# Patient Record
Sex: MALE | Race: WHITE | NOT HISPANIC OR LATINO | Employment: FULL TIME | URBAN - METROPOLITAN AREA
[De-identification: names, ages, dates, MRNs, and addresses within clinical notes are randomized per-mention and may not be internally consistent; named-entity substitution may affect disease eponyms.]

---

## 2023-01-13 ENCOUNTER — TELEPHONE (OUTPATIENT)
Dept: SCHEDULING | Facility: IMAGING CENTER | Age: 28
End: 2023-01-13

## 2023-01-13 ENCOUNTER — OFFICE VISIT (OUTPATIENT)
Dept: MEDICAL GROUP | Facility: MEDICAL CENTER | Age: 28
End: 2023-01-13
Payer: COMMERCIAL

## 2023-01-13 VITALS
HEIGHT: 69 IN | SYSTOLIC BLOOD PRESSURE: 115 MMHG | HEART RATE: 95 BPM | BODY MASS INDEX: 30.11 KG/M2 | WEIGHT: 203.26 LBS | DIASTOLIC BLOOD PRESSURE: 54 MMHG | TEMPERATURE: 98.6 F

## 2023-01-13 DIAGNOSIS — R10.31 RLQ ABDOMINAL PAIN: ICD-10-CM

## 2023-01-13 DIAGNOSIS — H61.22 IMPACTED CERUMEN OF LEFT EAR: ICD-10-CM

## 2023-01-13 DIAGNOSIS — M72.9 MUSCULOSKELETAL FIBROMATOSIS: ICD-10-CM

## 2023-01-13 DIAGNOSIS — R53.83 OTHER FATIGUE: ICD-10-CM

## 2023-01-13 DIAGNOSIS — F32.4 MAJOR DEPRESSIVE DISORDER WITH SINGLE EPISODE, IN PARTIAL REMISSION (HCC): ICD-10-CM

## 2023-01-13 DIAGNOSIS — F41.9 ANXIETY: ICD-10-CM

## 2023-01-13 DIAGNOSIS — Z11.59 ENCOUNTER FOR HEPATITIS C SCREENING TEST FOR LOW RISK PATIENT: ICD-10-CM

## 2023-01-13 DIAGNOSIS — R73.09 ELEVATED GLUCOSE LEVEL: ICD-10-CM

## 2023-01-13 LAB
APPEARANCE UR: NORMAL
BILIRUB UR STRIP-MCNC: NEGATIVE MG/DL
COLOR UR AUTO: YELLOW
GLUCOSE BLD-MCNC: 117 MG/DL (ref 70–100)
GLUCOSE UR STRIP.AUTO-MCNC: NEGATIVE MG/DL
KETONES UR STRIP.AUTO-MCNC: NEGATIVE MG/DL
LEUKOCYTE ESTERASE UR QL STRIP.AUTO: NEGATIVE
NITRITE UR QL STRIP.AUTO: NEGATIVE
PH UR STRIP.AUTO: 7.5 [PH] (ref 5–8)
PROT UR QL STRIP: NEGATIVE MG/DL
RBC UR QL AUTO: NEGATIVE
SP GR UR STRIP.AUTO: 1.02
UROBILINOGEN UR STRIP-MCNC: NORMAL MG/DL

## 2023-01-13 PROCEDURE — 81002 URINALYSIS NONAUTO W/O SCOPE: CPT | Performed by: FAMILY MEDICINE

## 2023-01-13 PROCEDURE — 99204 OFFICE O/P NEW MOD 45 MIN: CPT | Performed by: FAMILY MEDICINE

## 2023-01-13 PROCEDURE — 82962 GLUCOSE BLOOD TEST: CPT | Performed by: FAMILY MEDICINE

## 2023-01-13 ASSESSMENT — PATIENT HEALTH QUESTIONNAIRE - PHQ9
SUM OF ALL RESPONSES TO PHQ QUESTIONS 1-9: 8
CLINICAL INTERPRETATION OF PHQ2 SCORE: 1
5. POOR APPETITE OR OVEREATING: 2 - MORE THAN HALF THE DAYS

## 2023-01-13 ASSESSMENT — ANXIETY QUESTIONNAIRES
4. TROUBLE RELAXING: NOT AT ALL
7. FEELING AFRAID AS IF SOMETHING AWFUL MIGHT HAPPEN: NOT AT ALL
2. NOT BEING ABLE TO STOP OR CONTROL WORRYING: SEVERAL DAYS
3. WORRYING TOO MUCH ABOUT DIFFERENT THINGS: MORE THAN HALF THE DAYS
1. FEELING NERVOUS, ANXIOUS, OR ON EDGE: SEVERAL DAYS
5. BEING SO RESTLESS THAT IT IS HARD TO SIT STILL: SEVERAL DAYS
6. BECOMING EASILY ANNOYED OR IRRITABLE: SEVERAL DAYS
GAD7 TOTAL SCORE: 6

## 2023-01-14 NOTE — ASSESSMENT & PLAN NOTE
Chronic, stable.  Differentials include thyroid, low testosterone, anemia, electrolyte disturbances, diabetes.  Collected POC glucose, 114.  Ordered A1c for the patient.  As potential sources for fatigue.  Recommended that he eat a pretty regular diet, healthy.  Increasing cardiovascular exercise.

## 2023-01-14 NOTE — ASSESSMENT & PLAN NOTE
Chronic, stable.  Recommended to reach out if he feels that his depression symptoms are starting to worsen.  Offered talk therapy services and potential medication management options.

## 2023-01-14 NOTE — ASSESSMENT & PLAN NOTE
Chronic, stable.  Right lower quadrant ultrasound ordered for the patient.  Discussed signs and symptoms of when to seek treatment the emergency room.

## 2023-01-14 NOTE — PROGRESS NOTES
Yusuf Thibodeaux is a pleasant 27 y.o. male here for   Chief Complaint   Patient presents with    Establish Care    GI Problem    RLQ Pain      HPI:   Problem   Anxiety    History of anxiety symptoms, feels that his anxiety is relatively stable.  Has episodes where he feels self-conscious and focuses on small things.  Feels more self consouse and focuses on things.  Does report increased anxiety and depression symptoms towards the end of the year secondary to his current job.  States that since he ended the year is over he is feeling much better.    LISSA-7 Questionnaire    Feeling nervous, anxious, or on edge: Several days  Not being able to sop or control worrying: Several days  Worrying too much about different things: More than half the days  Trouble relaxing: Not at all  Being so restless that it's hard to sit still: Several days  Becoming easily annoyed or irritable: Several days  Feeling afraid as if something awful might happen: Not at all  Total: 6    Interpretation of LISSA 7 Total Score   Score Severity :  0-4 No Anxiety   5-9 Mild Anxiety  10-14 Moderate Anxiety  15-21 Severe Anxiety     Major Depressive Disorder With Single Episode, in Partial Remission (Hcc)    Was having depression symptoms sometime ago in which she ended up reaching out to an online clinical psychologist.  States he did really well during that come to find out that his job is the primary source of his depression.  Depression improved when he changed jobs.     Depression Screening    Little interest or pleasure in doing things?  1 - several days   Feeling down, depressed , or hopeless? 0 - not at all   Trouble falling or staying asleep, or sleeping too much?  1 - several days   Feeling tired or having little energy?  2 - more than half the days   Poor appetite or overeating?  2 - more than half the days   Feeling bad about yourself - or that you are a failure or have let yourself or your family down? 2 - more than half the days   Trouble  concentrating on things, such as reading the newspaper or watching television? 0 - not at all   Moving or speaking so slowly that other people could have noticed.  Or the opposite - being so fidgety or restless that you have been moving around a lot more than usual?  0 - not at all   Thoughts that you would be better off dead, or of hurting yourself?  0 - not at all   Patient Health Questionnaire Score: 8       If depressive symptoms identified deferred to follow up visit unless specifically addressed in assesment and plan.    Interpretation of PHQ-9 Total Score   Score Severity   1-4 No Depression   5-9 Mild Depression   10-14 Moderate Depression   15-19 Moderately Severe Depression   20-27 Severe Depression     Other Fatigue    Fatigue for the last 3 months, started to take extra protein to help.  Reports more frothy urine and now with foul smelling, describes as ammonia.  Reporting more spontaneous bowel movements, will range between normal BMs and diarrhea.  Increase in thirst at work, but not outside of that.  Denies changes in vision.     For his fatigue will run x 2 times a week and lift weights 3 times a week.  Week days will get about 7 hours, weekends will get 8 hours of sleep.  Not well rested in the am.  Denies excessive dry skin, for hair falling out.    Since the start of fatigue he has also reporting right lower quadrant discomfort.     Rlq Abdominal Pain    Dull pain started about 3 months ago.  Comes and goes.  Worse with the need for a BM, improves after BM.  Denies blood in stool, N/V  Change with eating patterns.     Musculoskeletal Fibromatosis    2 cyst removals to the right hand.  Denies any reoccurrences of cysts.  Last surgery was 9 year     Closed Fracture of Right Clavicle (Resolved)        Current Medicines (including changes today)  Current Outpatient Medications   Medication Sig Dispense Refill    MELATONIN PO Take  by mouth.       No current facility-administered medications for this  "visit.     Past Medical/ Surgical History  He  has a past medical history of Closed fracture of right clavicle (11/13/2015).  He  has a past surgical history that includes cystectomy (Right) and other orthopedic surgery.     Objective:     /54 (BP Location: Right arm, Patient Position: Sitting, BP Cuff Size: Adult)   Pulse 95   Temp 37 °C (98.6 °F) (Temporal)   Ht 1.753 m (5' 9\")   Wt 92.2 kg (203 lb 4.2 oz)  Body mass index is 30.02 kg/m².    Physical Exam  Constitutional:       General: He is not in acute distress.  HENT:      Head: Normocephalic and atraumatic.      Right Ear: Tympanic membrane and external ear normal.      Left Ear: Tympanic membrane and external ear normal.   Eyes:      General: Lids are normal.      Extraocular Movements: Extraocular movements intact.      Conjunctiva/sclera: Conjunctivae normal.      Pupils: Pupils are equal, round, and reactive to light.   Neck:      Trachea: Trachea normal.   Cardiovascular:      Rate and Rhythm: Normal rate and regular rhythm.      Heart sounds: Normal heart sounds. No murmur heard.    No friction rub. No gallop.   Pulmonary:      Effort: Pulmonary effort is normal. No accessory muscle usage.      Breath sounds: Normal breath sounds. No wheezing or rales.   Abdominal:      General: Bowel sounds are normal.      Palpations: Abdomen is soft.      Tenderness: There is abdominal tenderness in the right lower quadrant.   Musculoskeletal:      Cervical back: Normal range of motion and neck supple.      Right lower leg: No edema.      Left lower leg: No edema.   Lymphadenopathy:      Cervical: No cervical adenopathy.   Skin:     General: Skin is warm and dry.      Findings: No rash.   Neurological:      General: No focal deficit present.      Mental Status: He is alert and oriented to person, place, and time. Mental status is at baseline.      GCS: GCS eye subscore is 4. GCS verbal subscore is 5. GCS motor subscore is 6.      Motor: No weakness.      " Gait: Gait is intact.      Deep Tendon Reflexes:      Reflex Scores:       Patellar reflexes are 2+ on the right side and 2+ on the left side.  Psychiatric:         Attention and Perception: Attention normal.         Mood and Affect: Mood and affect normal.         Speech: Speech normal.        Imaging:  No imaging    Labs  2023 POC glucose: 114  2023 POCT urinalysis  Glucose: Neg  Bilirubin: Neg  Ketones: Neg  S.020  Blood: Neg  pH: 7.5  Protein: Neg  uro: 0.2  Nitrites: Neg  Leuks: Neg   Assessment and Plan:   The following treatment plan was discussed     Problem List Items Addressed This Visit       Musculoskeletal fibromatosis     Chronic, stable.  Continue to monitor for new occurrence of cyst.         Anxiety     Chronic, stable.  Advised to continue to monitor his anxiety symptoms.  Offered referral to talk therapy or medication discussion.         Major depressive disorder with single episode, in partial remission (HCC)     Chronic, stable.  Recommended to reach out if he feels that his depression symptoms are starting to worsen.  Offered talk therapy services and potential medication management options.         Other fatigue     Chronic, stable.  Differentials include thyroid, low testosterone, anemia, electrolyte disturbances, diabetes.  Collected POC glucose, 114.  Ordered A1c for the patient.  As potential sources for fatigue.  Recommended that he eat a pretty regular diet, healthy.  Increasing cardiovascular exercise.         Relevant Orders    POCT Urinalysis    POCT Glucose (Completed)    TSH WITH REFLEX TO FT4    CBC WITH DIFFERENTIAL    Comp Metabolic Panel    ESTIMATED GFR    VITAMIN D,25 HYDROXY (DEFICIENCY)    TESTOSTERONE SERUM    IRON/TOTAL IRON BIND    FERRITIN    RLQ abdominal pain     Chronic, stable.  Right lower quadrant ultrasound ordered for the patient.  Discussed signs and symptoms of when to seek treatment the emergency room.         Relevant Orders    US-APPENDIX      Other Visit Diagnoses       Elevated glucose level        Relevant Orders    HEMOGLOBIN A1C    Impacted cerumen of left ear        Relevant Orders    Ear Irrigation (MA Only)    Encounter for hepatitis C screening test for low risk patient        Relevant Orders    HCV Scrn ( [id number] [employer])             Followup: Return in about 4 weeks (around 2/10/2023), or if symptoms worsen or fail to improve, for Follow-up for labs.    I have placed POC UA and POC glucose, ear irrigation orders.  The MA is preforming POC UA and POC glucose, ear irrigation orders under the direction of Dr. Rousseau    Please note that this dictation was created using voice recognition software. I have made every reasonable attempt to correct obvious errors, but I expect that there are errors of grammar and possibly content that I did not discover before finalizing the note.

## 2023-01-14 NOTE — ASSESSMENT & PLAN NOTE
Chronic, stable.  Advised to continue to monitor his anxiety symptoms.  Offered referral to talk therapy or medication discussion.

## 2023-01-20 ENCOUNTER — OFFICE VISIT (OUTPATIENT)
Dept: URGENT CARE | Facility: CLINIC | Age: 28
End: 2023-01-20
Payer: COMMERCIAL

## 2023-01-20 VITALS
OXYGEN SATURATION: 99 % | SYSTOLIC BLOOD PRESSURE: 114 MMHG | HEART RATE: 82 BPM | HEIGHT: 69 IN | DIASTOLIC BLOOD PRESSURE: 72 MMHG | BODY MASS INDEX: 29.77 KG/M2 | WEIGHT: 201 LBS | RESPIRATION RATE: 12 BRPM | TEMPERATURE: 98.5 F

## 2023-01-20 DIAGNOSIS — H61.22 IMPACTED CERUMEN OF LEFT EAR: ICD-10-CM

## 2023-01-20 PROCEDURE — 69209 REMOVE IMPACTED EAR WAX UNI: CPT | Performed by: PHYSICIAN ASSISTANT

## 2023-01-20 PROCEDURE — 99212 OFFICE O/P EST SF 10 MIN: CPT | Mod: 25 | Performed by: PHYSICIAN ASSISTANT

## 2023-01-20 ASSESSMENT — ENCOUNTER SYMPTOMS
DIARRHEA: 0
DIZZINESS: 0
SINUS PAIN: 0
CHILLS: 0
EYE DISCHARGE: 0
EYE REDNESS: 0
ABDOMINAL PAIN: 0
VOMITING: 0
COUGH: 0
NAUSEA: 0
WHEEZING: 0
EYE PAIN: 0
DIAPHORESIS: 0
HEADACHES: 0
SORE THROAT: 0
SHORTNESS OF BREATH: 0
CONSTIPATION: 0
FEVER: 0

## 2023-01-20 ASSESSMENT — VISUAL ACUITY
OS_CC: 20/25
OD_CC: 20/20

## 2023-01-21 NOTE — PROGRESS NOTES
"  Subjective:     Yusuf Thibodeaux  is a 27 y.o. male who presents for Ear Fullness (LEFT x1 week, has been using debrox at home with no improvement )       He presents today with left-sided ear fullness x1 week. patient states that he has been using naproxen and was seen at his primary care provider whom did attempt a ear lavage but was unsuccessful.  Has been having muffled hearing in the ear since onset.  Does have history of cerumen impaction.  No trauma or injury to the ear noted.  No ear drainage noted.  Denies fever/chills/sweats, chest pain, shortness of breath, nausea/vomiting, abdominal pain, diarrhea. Has been using OTC wax drops.     Review of Systems   Constitutional:  Negative for chills, diaphoresis, fever and malaise/fatigue.   HENT:  Negative for congestion, ear discharge, sinus pain and sore throat.         Left-sided ear fullness   Eyes:  Negative for pain, discharge and redness.   Respiratory:  Negative for cough, shortness of breath and wheezing.    Cardiovascular:  Negative for chest pain.   Gastrointestinal:  Negative for abdominal pain, constipation, diarrhea, nausea and vomiting.   Neurological:  Negative for dizziness and headaches.    No Known Allergies  Past Medical History:   Diagnosis Date    Closed fracture of right clavicle 11/13/2015        Objective:   /72   Pulse 82   Temp 36.9 °C (98.5 °F) (Temporal)   Resp 12   Ht 1.753 m (5' 9\")   Wt 91.2 kg (201 lb)   SpO2 99%   BMI 29.68 kg/m²   Physical Exam  Vitals and nursing note reviewed.   Constitutional:       General: He is not in acute distress.     Appearance: Normal appearance. He is not ill-appearing, toxic-appearing or diaphoretic.   HENT:      Head: Normocephalic.      Right Ear: Tympanic membrane, ear canal and external ear normal. There is no impacted cerumen.      Left Ear: Tympanic membrane, ear canal and external ear normal. There is impacted cerumen.      Nose: No congestion or rhinorrhea.      Mouth/Throat:      " Mouth: Mucous membranes are moist.      Pharynx: No oropharyngeal exudate or posterior oropharyngeal erythema.   Eyes:      General:         Right eye: No discharge.         Left eye: No discharge.      Conjunctiva/sclera: Conjunctivae normal.   Cardiovascular:      Rate and Rhythm: Normal rate and regular rhythm.   Pulmonary:      Effort: Pulmonary effort is normal. No respiratory distress.      Breath sounds: Normal breath sounds. No stridor. No wheezing or rhonchi.   Musculoskeletal:      Cervical back: Neck supple.   Lymphadenopathy:      Cervical: No cervical adenopathy.   Neurological:      General: No focal deficit present.      Mental Status: He is alert and oriented to person, place, and time.   Psychiatric:         Mood and Affect: Mood normal.         Behavior: Behavior normal.         Thought Content: Thought content normal.         Judgment: Judgment normal.           Diagnostic testing: None    Assessment/Plan:     Encounter Diagnoses   Name Primary?    Impacted cerumen of left ear           Plan for care for today's complaint includes ear lavage in office today.  Ear lavage did fully remove all impacted cerumen from the left ear.  Reexamination showed normal TM and normal external canal.  Patient did have improved hearing following ear lavage.  Continue to monitor symptoms and return to urgent care or follow-up with primary care provider if symptoms remain ongoing.  Follow-up in the emergency department if symptoms become severe, ER precautions discussed in office today..    See AVS Instructions below for written guidance provided to patient on after-visit management and care in addition to our verbal discussion during the visit.    Please note that this dictation was created using voice recognition software. I have attempted to correct all errors, but there may be sound-alike, spelling, grammar and possibly content errors that I did not discover before finalizing the note.    Festus Araujo PA-C

## 2023-02-02 ENCOUNTER — HOSPITAL ENCOUNTER (OUTPATIENT)
Dept: RADIOLOGY | Facility: MEDICAL CENTER | Age: 28
End: 2023-02-02
Attending: FAMILY MEDICINE
Payer: COMMERCIAL

## 2023-02-02 DIAGNOSIS — R10.31 RLQ ABDOMINAL PAIN: ICD-10-CM

## 2023-02-02 PROCEDURE — 76705 ECHO EXAM OF ABDOMEN: CPT

## 2023-02-08 ENCOUNTER — HOSPITAL ENCOUNTER (OUTPATIENT)
Dept: LAB | Facility: MEDICAL CENTER | Age: 28
End: 2023-02-08
Attending: FAMILY MEDICINE
Payer: COMMERCIAL

## 2023-02-08 DIAGNOSIS — Z11.59 ENCOUNTER FOR HEPATITIS C SCREENING TEST FOR LOW RISK PATIENT: ICD-10-CM

## 2023-02-08 DIAGNOSIS — R53.83 OTHER FATIGUE: ICD-10-CM

## 2023-02-08 DIAGNOSIS — R73.09 ELEVATED GLUCOSE LEVEL: ICD-10-CM

## 2023-02-08 LAB
25(OH)D3 SERPL-MCNC: 17 NG/ML (ref 30–100)
ALBUMIN SERPL BCP-MCNC: 4.9 G/DL (ref 3.2–4.9)
ALBUMIN/GLOB SERPL: 2.1 G/DL
ALP SERPL-CCNC: 129 U/L (ref 30–99)
ALT SERPL-CCNC: 70 U/L (ref 2–50)
ANION GAP SERPL CALC-SCNC: 12 MMOL/L (ref 7–16)
AST SERPL-CCNC: 41 U/L (ref 12–45)
BASOPHILS # BLD AUTO: 0.6 % (ref 0–1.8)
BASOPHILS # BLD: 0.04 K/UL (ref 0–0.12)
BILIRUB SERPL-MCNC: 1.4 MG/DL (ref 0.1–1.5)
BUN SERPL-MCNC: 8 MG/DL (ref 8–22)
CALCIUM ALBUM COR SERPL-MCNC: 8.9 MG/DL (ref 8.5–10.5)
CALCIUM SERPL-MCNC: 9.6 MG/DL (ref 8.5–10.5)
CHLORIDE SERPL-SCNC: 102 MMOL/L (ref 96–112)
CO2 SERPL-SCNC: 23 MMOL/L (ref 20–33)
CREAT SERPL-MCNC: 0.76 MG/DL (ref 0.5–1.4)
EOSINOPHIL # BLD AUTO: 0.08 K/UL (ref 0–0.51)
EOSINOPHIL NFR BLD: 1.3 % (ref 0–6.9)
ERYTHROCYTE [DISTWIDTH] IN BLOOD BY AUTOMATED COUNT: 37.2 FL (ref 35.9–50)
EST. AVERAGE GLUCOSE BLD GHB EST-MCNC: 88 MG/DL
FERRITIN SERPL-MCNC: 515 NG/ML (ref 22–322)
GFR SERPLBLD CREATININE-BSD FMLA CKD-EPI: 124 ML/MIN/1.73 M 2
GLOBULIN SER CALC-MCNC: 2.3 G/DL (ref 1.9–3.5)
GLUCOSE SERPL-MCNC: 89 MG/DL (ref 65–99)
HBA1C MFR BLD: 4.7 % (ref 4–5.6)
HCT VFR BLD AUTO: 48.7 % (ref 42–52)
HCV AB SER QL: NORMAL
HGB BLD-MCNC: 18.1 G/DL (ref 14–18)
IMM GRANULOCYTES # BLD AUTO: 0.03 K/UL (ref 0–0.11)
IMM GRANULOCYTES NFR BLD AUTO: 0.5 % (ref 0–0.9)
IRON SATN MFR SERPL: 28 % (ref 15–55)
IRON SERPL-MCNC: 80 UG/DL (ref 50–180)
LYMPHOCYTES # BLD AUTO: 1.38 K/UL (ref 1–4.8)
LYMPHOCYTES NFR BLD: 22.1 % (ref 22–41)
MCH RBC QN AUTO: 30.8 PG (ref 27–33)
MCHC RBC AUTO-ENTMCNC: 37.2 G/DL (ref 33.7–35.3)
MCV RBC AUTO: 83 FL (ref 81.4–97.8)
MONOCYTES # BLD AUTO: 0.63 K/UL (ref 0–0.85)
MONOCYTES NFR BLD AUTO: 10.1 % (ref 0–13.4)
NEUTROPHILS # BLD AUTO: 4.08 K/UL (ref 1.82–7.42)
NEUTROPHILS NFR BLD: 65.4 % (ref 44–72)
NRBC # BLD AUTO: 0 K/UL
NRBC BLD-RTO: 0 /100 WBC
PLATELET # BLD AUTO: 216 K/UL (ref 164–446)
PMV BLD AUTO: 11.6 FL (ref 9–12.9)
POTASSIUM SERPL-SCNC: 4.1 MMOL/L (ref 3.6–5.5)
PROT SERPL-MCNC: 7.2 G/DL (ref 6–8.2)
RBC # BLD AUTO: 5.87 M/UL (ref 4.7–6.1)
SODIUM SERPL-SCNC: 137 MMOL/L (ref 135–145)
TESTOST SERPL-MCNC: 450 NG/DL (ref 175–781)
TIBC SERPL-MCNC: 290 UG/DL (ref 250–450)
TSH SERPL DL<=0.005 MIU/L-ACNC: 1.43 UIU/ML (ref 0.38–5.33)
UIBC SERPL-MCNC: 210 UG/DL (ref 110–370)
WBC # BLD AUTO: 6.2 K/UL (ref 4.8–10.8)

## 2023-02-08 PROCEDURE — 36415 COLL VENOUS BLD VENIPUNCTURE: CPT

## 2023-02-08 PROCEDURE — 80053 COMPREHEN METABOLIC PANEL: CPT

## 2023-02-08 PROCEDURE — G0472 HEP C SCREEN HIGH RISK/OTHER: HCPCS

## 2023-02-08 PROCEDURE — 85025 COMPLETE CBC W/AUTO DIFF WBC: CPT

## 2023-02-08 PROCEDURE — 82306 VITAMIN D 25 HYDROXY: CPT

## 2023-02-08 PROCEDURE — 84403 ASSAY OF TOTAL TESTOSTERONE: CPT

## 2023-02-08 PROCEDURE — 82728 ASSAY OF FERRITIN: CPT

## 2023-02-08 PROCEDURE — 83550 IRON BINDING TEST: CPT

## 2023-02-08 PROCEDURE — 84443 ASSAY THYROID STIM HORMONE: CPT

## 2023-02-08 PROCEDURE — 83036 HEMOGLOBIN GLYCOSYLATED A1C: CPT

## 2023-02-08 PROCEDURE — 83540 ASSAY OF IRON: CPT

## 2023-02-10 ENCOUNTER — OFFICE VISIT (OUTPATIENT)
Dept: MEDICAL GROUP | Facility: MEDICAL CENTER | Age: 28
End: 2023-02-10
Payer: COMMERCIAL

## 2023-02-10 VITALS
WEIGHT: 200 LBS | HEIGHT: 69 IN | RESPIRATION RATE: 16 BRPM | HEART RATE: 89 BPM | SYSTOLIC BLOOD PRESSURE: 122 MMHG | DIASTOLIC BLOOD PRESSURE: 82 MMHG | TEMPERATURE: 97.8 F | OXYGEN SATURATION: 96 % | BODY MASS INDEX: 29.62 KG/M2

## 2023-02-10 DIAGNOSIS — R79.89 ELEVATED LFTS: ICD-10-CM

## 2023-02-10 DIAGNOSIS — E55.9 VITAMIN D INSUFFICIENCY: ICD-10-CM

## 2023-02-10 DIAGNOSIS — R79.89 ELEVATED FERRITIN: ICD-10-CM

## 2023-02-10 PROCEDURE — 99214 OFFICE O/P EST MOD 30 MIN: CPT | Performed by: FAMILY MEDICINE

## 2023-02-10 ASSESSMENT — FIBROSIS 4 INDEX: FIB4 SCORE: 0.66

## 2023-02-11 NOTE — PROGRESS NOTES
Yusuf Thibodeaux is a pleasant 27 y.o. male here for   Chief Complaint   Patient presents with    Follow-Up      HPI:   Problem   Elevated Lfts       Latest Reference Range & Units 02/08/23 13:28   AST(SGOT) 12 - 45 U/L 41   ALT(SGPT) 2 - 50 U/L 70 (H)   Alkaline Phosphatase 30 - 99 U/L 129 (H)   Total Bilirubin 0.1 - 1.5 mg/dL 1.4   Albumin 3.2 - 4.9 g/dL 4.9   Total Protein 6.0 - 8.2 g/dL 7.2   Globulin 1.9 - 3.5 g/dL 2.3   A-G Ratio g/dL 2.1   (H): Data is abnormally high     Vitamin D Insufficiency       Latest Reference Range & Units 02/08/23 13:28   25-Hydroxy   Vitamin D 25 30 - 100 ng/mL 17 (L)   (L): Data is abnormally low     Elevated Ferritin    Recent completed labs which showed elevated ferritin levels.  Denies any family history for hemochromatosis.  Patient is positive for fatigue.     Latest Reference Range & Units 02/08/23 13:28   Ferritin 22.0 - 322.0 ng/mL 515.0 (H)   (H): Data is abnormally high     Latest Reference Range & Units 02/08/23 13:28   Iron 50 - 180 ug/dL 80   Total Iron Binding 250 - 450 ug/dL 290   % Saturation 15 - 55 % 28   Unsat Iron Binding 110 - 370 ug/dL 210        Latest Reference Range & Units 02/08/23 13:28   RBC 4.70 - 6.10 M/uL 5.87   Hemoglobin 14.0 - 18.0 g/dL 18.1 (H)   Hematocrit 42.0 - 52.0 % 48.7   MCV 81.4 - 97.8 fL 83.0   MCH 27.0 - 33.0 pg 30.8   MCHC 33.7 - 35.3 g/dL 37.2 (H)   (H): Data is abnormally high          Current Medicines (including changes today)  Current Outpatient Medications   Medication Sig Dispense Refill    MELATONIN PO Take  by mouth.       No current facility-administered medications for this visit.     Past Medical/ Surgical History  He  has a past medical history of Closed fracture of right clavicle (11/13/2015).  He  has a past surgical history that includes cystectomy (Right) and other orthopedic surgery.     Objective:     /82 (BP Location: Left arm, Patient Position: Sitting, BP Cuff Size: Adult)   Pulse 89   Temp 36.6 °C  "(97.8 °F) (Temporal)   Resp 16   Ht 1.753 m (5' 9\")   Wt 90.7 kg (200 lb)   SpO2 96%  Body mass index is 29.53 kg/m².    Physical Exam  Constitutional:       General: He is not in acute distress.  HENT:      Head: Normocephalic and atraumatic.   Eyes:      Conjunctiva/sclera: Conjunctivae normal.      Pupils: Pupils are equal, round, and reactive to light.   Pulmonary:      Effort: Pulmonary effort is normal. No respiratory distress.   Abdominal:      General: There is no distension.   Musculoskeletal:      Cervical back: Normal range of motion and neck supple.   Skin:     General: Skin is warm and dry.      Findings: No rash.   Neurological:      Mental Status: He is alert and oriented to person, place, and time.      Gait: Gait is intact.   Psychiatric:         Mood and Affect: Affect normal.        Imaging:  No imaging    Labs  Recent labs from 02/08/2023 reviewed with the patient.    Assessment and Plan:   The following treatment plan was discussed     Problem List Items Addressed This Visit       Elevated LFTs     New diagnosis for the patient.  I do suspect elevation to the liver enzymes and alkaline phosphatase may have something to do with the elevated ferritin levels.  Ultrasound to the right upper quadrant placed for the patient.         Relevant Orders    US-RUQ    ALKALINE PHOSPHATASE ISOENZYMES    Vitamin D insufficiency     New diagnosis for the patient.  Advised to take vitamin D3 2000 units daily to help with his levels.         Elevated ferritin     Unknown diagnosis with uncertain prognosis.  Suspect possible hemochromatosis.  Recommended that he complete a trans ferritin saturation and hereditary hemochromatosis.  Also recommended that he complete alkaline phosphatase isoenzymes.  I do suspect that this is liver involvement for the elevated ferritin levels.  Recommended that the patient donate blood and recheck his CBC, ferritin, and circulating iron 4 weeks after.  Discussed with the patient " that if these come back normal, may consider referral to hematology for further work-up.         Relevant Orders    HEREDITARY HEMOCHROMOTOSIS    TRANSFERRIN SATURATION    CBC WITH DIFFERENTIAL    IRON/TOTAL IRON BIND    FERRITIN    US-RUQ    ALKALINE PHOSPHATASE ISOENZYMES        Followup: Return in about 3 months (around 5/10/2023), or if symptoms worsen or fail to improve, for Follow-up and labs.        Please note that this dictation was created using voice recognition software. I have made every reasonable attempt to correct obvious errors, but I expect that there are errors of grammar and possibly content that I did not discover before finalizing the note.

## 2023-02-11 NOTE — ASSESSMENT & PLAN NOTE
Unknown diagnosis with uncertain prognosis.  Suspect possible hemochromatosis.  Recommended that he complete a trans ferritin saturation and hereditary hemochromatosis.  Also recommended that he complete alkaline phosphatase isoenzymes.  I do suspect that this is liver involvement for the elevated ferritin levels.  Recommended that the patient donate blood and recheck his CBC, ferritin, and circulating iron 4 weeks after.  Discussed with the patient that if these come back normal, may consider referral to hematology for further work-up.

## 2023-02-11 NOTE — ASSESSMENT & PLAN NOTE
New diagnosis for the patient.  Advised to take vitamin D3 2000 units daily to help with his levels.

## 2023-02-11 NOTE — ASSESSMENT & PLAN NOTE
New diagnosis for the patient.  I do suspect elevation to the liver enzymes and alkaline phosphatase may have something to do with the elevated ferritin levels.  Ultrasound to the right upper quadrant placed for the patient.

## 2023-03-09 ENCOUNTER — HOSPITAL ENCOUNTER (OUTPATIENT)
Dept: RADIOLOGY | Facility: MEDICAL CENTER | Age: 28
End: 2023-03-09
Attending: FAMILY MEDICINE
Payer: COMMERCIAL

## 2023-03-09 DIAGNOSIS — R79.89 ELEVATED FERRITIN: ICD-10-CM

## 2023-03-09 DIAGNOSIS — R79.89 ELEVATED LFTS: ICD-10-CM

## 2023-03-09 PROCEDURE — 76705 ECHO EXAM OF ABDOMEN: CPT

## 2023-04-17 ENCOUNTER — HOSPITAL ENCOUNTER (OUTPATIENT)
Dept: LAB | Facility: MEDICAL CENTER | Age: 28
End: 2023-04-17
Attending: FAMILY MEDICINE
Payer: COMMERCIAL

## 2023-04-17 DIAGNOSIS — R79.89 ELEVATED FERRITIN: ICD-10-CM

## 2023-04-17 DIAGNOSIS — R79.89 ELEVATED LFTS: ICD-10-CM

## 2023-04-17 LAB
BASOPHILS # BLD AUTO: 1.1 % (ref 0–1.8)
BASOPHILS # BLD: 0.06 K/UL (ref 0–0.12)
EOSINOPHIL # BLD AUTO: 0.2 K/UL (ref 0–0.51)
EOSINOPHIL NFR BLD: 3.8 % (ref 0–6.9)
ERYTHROCYTE [DISTWIDTH] IN BLOOD BY AUTOMATED COUNT: 37.6 FL (ref 35.9–50)
FERRITIN SERPL-MCNC: 362 NG/ML (ref 22–322)
HCT VFR BLD AUTO: 50.6 % (ref 42–52)
HGB BLD-MCNC: 18.3 G/DL (ref 14–18)
IMM GRANULOCYTES # BLD AUTO: 0.02 K/UL (ref 0–0.11)
IMM GRANULOCYTES NFR BLD AUTO: 0.4 % (ref 0–0.9)
IRON SATN MFR SERPL: 47 % (ref 15–55)
IRON SERPL-MCNC: 130 UG/DL (ref 50–180)
LYMPHOCYTES # BLD AUTO: 1.74 K/UL (ref 1–4.8)
LYMPHOCYTES NFR BLD: 32.9 % (ref 22–41)
MCH RBC QN AUTO: 30.8 PG (ref 27–33)
MCHC RBC AUTO-ENTMCNC: 36.2 G/DL (ref 33.7–35.3)
MCV RBC AUTO: 85.2 FL (ref 81.4–97.8)
MONOCYTES # BLD AUTO: 0.54 K/UL (ref 0–0.85)
MONOCYTES NFR BLD AUTO: 10.2 % (ref 0–13.4)
NEUTROPHILS # BLD AUTO: 2.73 K/UL (ref 1.82–7.42)
NEUTROPHILS NFR BLD: 51.6 % (ref 44–72)
NRBC # BLD AUTO: 0 K/UL
NRBC BLD-RTO: 0 /100 WBC
PLATELET # BLD AUTO: 210 K/UL (ref 164–446)
PMV BLD AUTO: 11.8 FL (ref 9–12.9)
RBC # BLD AUTO: 5.94 M/UL (ref 4.7–6.1)
TIBC SERPL-MCNC: 279 UG/DL (ref 250–450)
UIBC SERPL-MCNC: 149 UG/DL (ref 110–370)
WBC # BLD AUTO: 5.3 K/UL (ref 4.8–10.8)

## 2023-04-17 PROCEDURE — 36415 COLL VENOUS BLD VENIPUNCTURE: CPT

## 2023-04-17 PROCEDURE — 83550 IRON BINDING TEST: CPT

## 2023-04-17 PROCEDURE — 85025 COMPLETE CBC W/AUTO DIFF WBC: CPT

## 2023-04-17 PROCEDURE — 84080 ASSAY ALKALINE PHOSPHATASES: CPT

## 2023-04-17 PROCEDURE — 83540 ASSAY OF IRON: CPT

## 2023-04-17 PROCEDURE — 82728 ASSAY OF FERRITIN: CPT

## 2023-04-17 PROCEDURE — 81256 HFE GENE: CPT

## 2023-04-17 PROCEDURE — 84075 ASSAY ALKALINE PHOSPHATASE: CPT

## 2023-04-20 LAB
ALP BONE SERPL-CCNC: 62 U/L (ref 0–55)
ALP ISOS SERPL HS-CCNC: 0 U/L
ALP LIVER SERPL-CCNC: 62 U/L (ref 0–94)
ALP SERPL-CCNC: 124 U/L (ref 40–120)

## 2023-04-21 LAB
HFE GENE MUT ANL BLD/T: NORMAL
HFE P.C282Y BLD/T QL: NORMAL
HFE P.H63D BLD/T QL: NORMAL
HFE P.S65C BLD/T QL: NEGATIVE

## 2023-04-28 ENCOUNTER — OFFICE VISIT (OUTPATIENT)
Dept: MEDICAL GROUP | Facility: MEDICAL CENTER | Age: 28
End: 2023-04-28
Payer: COMMERCIAL

## 2023-04-28 VITALS
BODY MASS INDEX: 30.76 KG/M2 | OXYGEN SATURATION: 95 % | TEMPERATURE: 98.7 F | WEIGHT: 207.67 LBS | SYSTOLIC BLOOD PRESSURE: 110 MMHG | DIASTOLIC BLOOD PRESSURE: 68 MMHG | HEART RATE: 88 BPM | HEIGHT: 69 IN

## 2023-04-28 DIAGNOSIS — L53.9 REDNESS: ICD-10-CM

## 2023-04-28 DIAGNOSIS — R79.89 ELEVATED LFTS: ICD-10-CM

## 2023-04-28 DIAGNOSIS — R79.89 ELEVATED FERRITIN: ICD-10-CM

## 2023-04-28 DIAGNOSIS — R74.8 ELEVATED ALKALINE PHOSPHATASE LEVEL: ICD-10-CM

## 2023-04-28 PROCEDURE — 99214 OFFICE O/P EST MOD 30 MIN: CPT | Performed by: FAMILY MEDICINE

## 2023-04-28 ASSESSMENT — FIBROSIS 4 INDEX: FIB4 SCORE: 0.7

## 2023-04-28 NOTE — ASSESSMENT & PLAN NOTE
Chronic, stable.  Recommended the use of topical hydrocortisone 1% OTC cream once to twice daily to help with inflammation.

## 2023-04-28 NOTE — ASSESSMENT & PLAN NOTE
Chronic, Stable.  Recommended to continue to keep an eye on his alkaline phosphatase level and look for trending values.

## 2023-04-28 NOTE — PROGRESS NOTES
"Yusuf Thibodeaux is a pleasant 28 y.o. male here for   Chief Complaint   Patient presents with    Follow-Up     Bloodwork      HPI:   Problem   Elevated Alkaline Phosphatase Level    Repeated alkaline phosphatase levels isoenzymes but showed slight elevation to bone fractions with normal liver functions.     Redness    During physical exam it was noted that patient had redness to his nasal bridge.  He states that he gets redness when he wears his glasses for too long.  States he has sensitive skin.     Elevated Ferritin    Previous labs showed elevated ferritin.  He did do a partial blood donation, has been avoiding red meats, foods that are high in iron.  Recently obtained his labs which showed improvement in his value.  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 (Patient not taking: Reported on 4/28/2023)       No current facility-administered medications for this visit.     Past Medical/ Surgical History  He  has a past medical history of Closed fracture of right clavicle (11/13/2015).  He  has a past surgical history that includes cystectomy (Right) and other orthopedic surgery.     Objective:     /68 (BP Location: Right arm, Patient Position: Sitting, BP Cuff Size: Adult)   Pulse 88   Temp 37.1 °C (98.7 °F) (Temporal)   Ht 1.753 m (5' 9\")   Wt 94.2 kg (207 lb 10.8 oz)   SpO2 95%  Body mass index is 30.67 kg/m².    Physical Exam  Constitutional:       General: He is not in acute distress.  HENT:      Head: Normocephalic and " atraumatic.      Nose:      Comments: Redness to bilateral nasal bridge  Eyes:      Conjunctiva/sclera: Conjunctivae normal.      Pupils: Pupils are equal, round, and reactive to light.   Pulmonary:      Effort: Pulmonary effort is normal. No respiratory distress.   Abdominal:      General: There is no distension.   Musculoskeletal:      Cervical back: Normal range of motion and neck supple.   Skin:     General: Skin is warm and dry.      Findings: No rash.   Neurological:      Mental Status: He is alert and oriented to person, place, and time.      Gait: Gait is intact.   Psychiatric:         Mood and Affect: Affect normal.        Imaging:  No imaging    Labs  Recent labs from 04/17/2023 reviewed with the patient.    Assessment and Plan:   The following treatment plan was discussed     Problem List Items Addressed This Visit       Elevated LFTs    Relevant Orders    Comp Metabolic Panel    Elevated ferritin     Chronic, stable.  Reviewed labs with the patient.  Due to his positive heterozygous status for H63D and C282Y he may be at increased risk for some iron overload.  Recommended that we continue to watch his ferritin levels, as well as reduce the amount of iron that you are in taking in your diet.         Relevant Orders    CBC WITH DIFFERENTIAL    IRON/TOTAL IRON BIND    FERRITIN    Elevated alkaline phosphatase level     Chronic, Stable.  Recommended to continue to keep an eye on his alkaline phosphatase level and look for trending values.         Relevant Orders    ALKALINE PHOSPHATASE ISOENZYMES    Comp Metabolic Panel    Redness     Chronic, stable.  Recommended the use of topical hydrocortisone 1% OTC cream once to twice daily to help with inflammation.             Followup: Return in about 3 months (around 7/28/2023), or if symptoms worsen or fail to improve, for follow-up on labs.        Please note that this dictation was created using voice recognition software. I have made every reasonable attempt to  correct obvious errors, but I expect that there are errors of grammar and possibly content that I did not discover before finalizing the note.

## 2023-04-28 NOTE — ASSESSMENT & PLAN NOTE
Chronic, stable.  Reviewed labs with the patient.  Due to his positive heterozygous status for H63D and C282Y he may be at increased risk for some iron overload.  Recommended that we continue to watch his ferritin levels, as well as reduce the amount of iron that you are in taking in your diet.

## 2023-07-28 ENCOUNTER — APPOINTMENT (OUTPATIENT)
Dept: MEDICAL GROUP | Facility: MEDICAL CENTER | Age: 28
End: 2023-07-28
Payer: COMMERCIAL

## 2023-08-25 LAB
ALBUMIN SERPL-MCNC: 4.6 G/DL (ref 4.3–5.2)
ALBUMIN/GLOB SERPL: 2.1 {RATIO} (ref 1.2–2.2)
ALP BONE CFR SERPL: 47 % (ref 12–68)
ALP INTEST CFR SERPL: 0 % (ref 0–18)
ALP LIVER CFR SERPL: 53 % (ref 13–88)
ALP SERPL-CCNC: 145 IU/L (ref 44–121)
ALT SERPL-CCNC: 72 IU/L (ref 0–44)
AST SERPL-CCNC: 40 IU/L (ref 0–40)
BASOPHILS # BLD AUTO: 0 X10E3/UL (ref 0–0.2)
BASOPHILS NFR BLD AUTO: 1 %
BILIRUB SERPL-MCNC: 1.3 MG/DL (ref 0–1.2)
BUN SERPL-MCNC: 8 MG/DL (ref 6–20)
BUN/CREAT SERPL: 9 (ref 9–20)
CALCIUM SERPL-MCNC: 9.1 MG/DL (ref 8.7–10.2)
CHLORIDE SERPL-SCNC: 101 MMOL/L (ref 96–106)
CO2 SERPL-SCNC: 22 MMOL/L (ref 20–29)
CREAT SERPL-MCNC: 0.93 MG/DL (ref 0.76–1.27)
EGFRCR SERPLBLD CKD-EPI 2021: 113 ML/MIN/1.73
EOSINOPHIL # BLD AUTO: 0.1 X10E3/UL (ref 0–0.4)
EOSINOPHIL NFR BLD AUTO: 2 %
ERYTHROCYTE [DISTWIDTH] IN BLOOD BY AUTOMATED COUNT: 12.8 % (ref 11.6–15.4)
FERRITIN SERPL-MCNC: 465 NG/ML (ref 30–400)
GLOBULIN SER CALC-MCNC: 2.2 G/DL (ref 1.5–4.5)
GLUCOSE SERPL-MCNC: 94 MG/DL (ref 70–99)
HCT VFR BLD AUTO: 53.3 % (ref 37.5–51)
HGB BLD-MCNC: 18.3 G/DL (ref 13–17.7)
IMM GRANULOCYTES # BLD AUTO: 0 X10E3/UL (ref 0–0.1)
IMM GRANULOCYTES NFR BLD AUTO: 1 %
IMMATURE CELLS  115398: ABNORMAL
IRON SATN MFR SERPL: 23 % (ref 15–55)
IRON SERPL-MCNC: 69 UG/DL (ref 38–169)
LYMPHOCYTES # BLD AUTO: 1.2 X10E3/UL (ref 0.7–3.1)
LYMPHOCYTES NFR BLD AUTO: 28 %
MCH RBC QN AUTO: 30.3 PG (ref 26.6–33)
MCHC RBC AUTO-ENTMCNC: 34.3 G/DL (ref 31.5–35.7)
MCV RBC AUTO: 88 FL (ref 79–97)
MONOCYTES # BLD AUTO: 0.7 X10E3/UL (ref 0.1–0.9)
MONOCYTES NFR BLD AUTO: 16 %
MORPHOLOGY BLD-IMP: ABNORMAL
NEUTROPHILS # BLD AUTO: 2.3 X10E3/UL (ref 1.4–7)
NEUTROPHILS NFR BLD AUTO: 52 %
NRBC BLD AUTO-RTO: ABNORMAL %
PLATELET # BLD AUTO: 199 X10E3/UL (ref 150–450)
POTASSIUM SERPL-SCNC: 4.2 MMOL/L (ref 3.5–5.2)
PROT SERPL-MCNC: 6.8 G/DL (ref 6–8.5)
RBC # BLD AUTO: 6.03 X10E6/UL (ref 4.14–5.8)
SODIUM SERPL-SCNC: 138 MMOL/L (ref 134–144)
TIBC SERPL-MCNC: 299 UG/DL (ref 250–450)
UIBC SERPL-MCNC: 230 UG/DL (ref 111–343)
WBC # BLD AUTO: 4.4 X10E3/UL (ref 3.4–10.8)

## 2024-10-28 ENCOUNTER — OFFICE VISIT (OUTPATIENT)
Dept: URGENT CARE | Facility: PHYSICIAN GROUP | Age: 29
End: 2024-10-28
Payer: COMMERCIAL

## 2024-10-28 ENCOUNTER — APPOINTMENT (OUTPATIENT)
Dept: RADIOLOGY | Facility: IMAGING CENTER | Age: 29
End: 2024-10-28
Attending: PHYSICIAN ASSISTANT
Payer: COMMERCIAL

## 2024-10-28 VITALS
DIASTOLIC BLOOD PRESSURE: 70 MMHG | SYSTOLIC BLOOD PRESSURE: 110 MMHG | RESPIRATION RATE: 16 BRPM | BODY MASS INDEX: 30.66 KG/M2 | HEART RATE: 70 BPM | OXYGEN SATURATION: 95 % | WEIGHT: 207 LBS | TEMPERATURE: 98.3 F | HEIGHT: 69 IN

## 2024-10-28 DIAGNOSIS — M25.562 ACUTE PAIN OF LEFT KNEE: ICD-10-CM

## 2024-10-28 DIAGNOSIS — M25.562 ACUTE PAIN OF LEFT KNEE: Primary | ICD-10-CM

## 2024-10-28 PROCEDURE — 99213 OFFICE O/P EST LOW 20 MIN: CPT | Performed by: PHYSICIAN ASSISTANT

## 2024-10-28 PROCEDURE — 3078F DIAST BP <80 MM HG: CPT | Performed by: PHYSICIAN ASSISTANT

## 2024-10-28 PROCEDURE — 3074F SYST BP LT 130 MM HG: CPT | Performed by: PHYSICIAN ASSISTANT

## 2024-10-28 PROCEDURE — 73562 X-RAY EXAM OF KNEE 3: CPT | Mod: TC,LT | Performed by: RADIOLOGY

## 2024-10-28 ASSESSMENT — FIBROSIS 4 INDEX: FIB4 SCORE: 0.73
